# Patient Record
Sex: FEMALE | Race: BLACK OR AFRICAN AMERICAN
[De-identification: names, ages, dates, MRNs, and addresses within clinical notes are randomized per-mention and may not be internally consistent; named-entity substitution may affect disease eponyms.]

---

## 2018-11-27 PROBLEM — Z00.00 ENCOUNTER FOR PREVENTIVE HEALTH EXAMINATION: Noted: 2018-11-27

## 2018-11-28 ENCOUNTER — OTHER (OUTPATIENT)
Age: 33
End: 2018-11-28

## 2018-11-28 ENCOUNTER — APPOINTMENT (OUTPATIENT)
Dept: UROLOGY | Facility: CLINIC | Age: 33
End: 2018-11-28
Payer: COMMERCIAL

## 2018-11-28 VITALS
HEIGHT: 66 IN | HEART RATE: 80 BPM | WEIGHT: 143 LBS | SYSTOLIC BLOOD PRESSURE: 112 MMHG | RESPIRATION RATE: 17 BRPM | TEMPERATURE: 98.3 F | DIASTOLIC BLOOD PRESSURE: 72 MMHG | BODY MASS INDEX: 22.98 KG/M2

## 2018-11-28 DIAGNOSIS — Z87.74 PERSONAL HISTORY OF (CORRECTED) CONGENITAL MALFORMATIONS OF HEART AND CIRCULATORY SYSTEM: ICD-10-CM

## 2018-11-28 DIAGNOSIS — Z78.9 OTHER SPECIFIED HEALTH STATUS: ICD-10-CM

## 2018-11-28 PROCEDURE — 99204 OFFICE O/P NEW MOD 45 MIN: CPT

## 2018-12-13 ENCOUNTER — MESSAGE (OUTPATIENT)
Age: 33
End: 2018-12-13

## 2018-12-18 LAB — COMPN STONE: NORMAL

## 2019-01-22 ENCOUNTER — FORM ENCOUNTER (OUTPATIENT)
Age: 34
End: 2019-01-22

## 2019-01-23 ENCOUNTER — APPOINTMENT (OUTPATIENT)
Dept: CT IMAGING | Facility: IMAGING CENTER | Age: 34
End: 2019-01-23
Payer: COMMERCIAL

## 2019-01-23 ENCOUNTER — OUTPATIENT (OUTPATIENT)
Dept: OUTPATIENT SERVICES | Facility: HOSPITAL | Age: 34
LOS: 1 days | End: 2019-01-23
Payer: COMMERCIAL

## 2019-01-23 ENCOUNTER — APPOINTMENT (OUTPATIENT)
Dept: UROLOGY | Facility: CLINIC | Age: 34
End: 2019-01-23
Payer: COMMERCIAL

## 2019-01-23 DIAGNOSIS — N20.1 CALCULUS OF URETER: ICD-10-CM

## 2019-01-23 PROCEDURE — 99214 OFFICE O/P EST MOD 30 MIN: CPT

## 2019-01-23 PROCEDURE — 74178 CT ABD&PLV WO CNTR FLWD CNTR: CPT | Mod: 26

## 2020-12-29 ENCOUNTER — NON-APPOINTMENT (OUTPATIENT)
Age: 35
End: 2020-12-29

## 2020-12-29 DIAGNOSIS — R35.0 FREQUENCY OF MICTURITION: ICD-10-CM

## 2021-01-05 ENCOUNTER — APPOINTMENT (OUTPATIENT)
Dept: UROLOGY | Facility: CLINIC | Age: 36
End: 2021-01-05
Payer: COMMERCIAL

## 2021-01-05 PROCEDURE — 99214 OFFICE O/P EST MOD 30 MIN: CPT | Mod: 95

## 2021-01-05 RX ORDER — OXYCODONE 5 MG/1
5 TABLET ORAL
Qty: 12 | Refills: 0 | Status: ACTIVE | COMMUNITY
Start: 2021-01-05 | End: 1900-01-01

## 2021-01-05 NOTE — HISTORY OF PRESENT ILLNESS
[Other Location: e.g. School (Enter Location, City,State)___] : at [unfilled], at the time of the visit. [Medical Office: ([facility])___] : at the medical office located in  [Verbal consent obtained from patient] : the patient, [unfilled] [FreeTextEntry1] : The patient-doctor relationship has been established in a face to face fashion via real time video/audio HIPAA compliant communication using telemedicine software. The patient's identity has been confirmed. The patient was previously emailed a copy of the telemedicine consent. They have had a chance to review and has now given verbal consent and has requested care to be assessed and treated through telemedicine and understands there maybe limitations in this process and they may need further follow up care in the office and or hospital settings.\par \par Verbal consent given to me on 01/05/2021 at 8:33 AM by Ms. STEPHENSONDA BHUPENDRAOKKAR[place] [address]. \par \par 34yo female with cc of stone. Pt with hx of stones last seen by me 2y ago during acute stone episode. She had L ureteral stone that she was able to pass on her own. Stone analysis revealed 90/10 CaP/CaOx. Her last CT scan taken in follow-up to assess for stone passage showed only pelvic phleboliths and no additional renal stones. Plan was made for 24h urine for eval but pt did not follow-up. She called office recently with L flank pain radiating to lower back with associated nausea and emesis. Pt was empirically started on flomax and plan made for US eval. US 12/21 showed mild L hydro with possible distal L stone. \par \par She has had bladder pressure for the last week. On MARCY she went to urgent care and was given abx. She reports urine dip was negative but was still empirically started on cefpodxime. She called yesterday and culture was negative. She stopped the abx. She has been taking motrin 600-800mg as needed with occasional benefit. She had severe pain over the weekend with radiation down into vagina and up into L flank. She took 800mg for this. Has not seen stone pass. Continues to have bladder pressure

## 2021-01-05 NOTE — ASSESSMENT
[FreeTextEntry1] : Suspect stone is very distal based on hx and radiology findings. Pt has been able to pass stones on her own in the past. Discussed continued trial of medical expulsive therapy. \par --cont medical expulsive therapy with flomax\par --pain control with oxycodone, tylenol 1000mg 4x per day and IBU 600mg 4x per day prn\par --stone precautions\par --encourage fluid intake\par --Renal/bladder US in ~3 weeks\par --Needs 24h urine after resolution of this stone episode

## 2021-01-28 ENCOUNTER — APPOINTMENT (OUTPATIENT)
Dept: UROLOGY | Facility: CLINIC | Age: 36
End: 2021-01-28
Payer: COMMERCIAL

## 2021-01-28 VITALS — BODY MASS INDEX: 22.98 KG/M2 | WEIGHT: 143 LBS | HEIGHT: 66 IN | TEMPERATURE: 98.2 F

## 2021-01-28 PROCEDURE — 99072 ADDL SUPL MATRL&STAF TM PHE: CPT

## 2021-01-28 PROCEDURE — 99213 OFFICE O/P EST LOW 20 MIN: CPT

## 2021-01-28 NOTE — HISTORY OF PRESENT ILLNESS
[FreeTextEntry1] : 36yo female with cc of stone. Pt with hx of stones seen by me 2y ago during acute stone episode. She had L ureteral stone that she was able to pass on her own. Stone analysis revealed 90/10 CaP/CaOx. Her last CT scan taken in follow-up to assess for stone passage showed only pelvic phleboliths and no additional renal stones. Plan was made for 24h urine for eval but pt did not follow-up. She called office recently with L flank pain radiating to lower back with associated nausea and emesis. Pt was empirically started on flomax and plan made for US eval. US 12/21 showed mild L hydro with possible distal L stone. Pt seen via telehealth appt earlier this month and there was suspicion of distal stone. She was placed on medical expulsive therapy. plan was made for trial of passage and repeat imaging with US. \par \par She returns today for follow-up. Pain stopped January 7th. She didn’t see a stone pass. Urinary sx have resolved. Did not have repeat ultrasound. Pt reports she has made a conscious effort to drink more water. She is no longer drinking coffee.

## 2021-01-28 NOTE — PHYSICAL EXAM
[General Appearance - In No Acute Distress] : no acute distress [Exaggerated Use Of Accessory Muscles For Inspiration] : no accessory muscle use [Oriented To Time, Place, And Person] : oriented to person, place, and time

## 2021-01-28 NOTE — ASSESSMENT
[FreeTextEntry1] : Suspect stone passed. Still needs imaging eval. \par --d/c flomax\par --Renal/bladder US \par --24h urine eval\par --TEM in 4-6 weeks

## 2021-02-05 ENCOUNTER — APPOINTMENT (OUTPATIENT)
Dept: ULTRASOUND IMAGING | Facility: CLINIC | Age: 36
End: 2021-02-05
Payer: COMMERCIAL

## 2021-02-05 ENCOUNTER — OUTPATIENT (OUTPATIENT)
Dept: OUTPATIENT SERVICES | Facility: HOSPITAL | Age: 36
LOS: 1 days | End: 2021-02-05
Payer: COMMERCIAL

## 2021-02-05 DIAGNOSIS — N20.1 CALCULUS OF URETER: ICD-10-CM

## 2021-02-05 DIAGNOSIS — Z00.8 ENCOUNTER FOR OTHER GENERAL EXAMINATION: ICD-10-CM

## 2021-02-05 PROCEDURE — 76775 US EXAM ABDO BACK WALL LIM: CPT | Mod: 26

## 2021-03-08 ENCOUNTER — APPOINTMENT (OUTPATIENT)
Dept: UROLOGY | Facility: CLINIC | Age: 36
End: 2021-03-08
Payer: COMMERCIAL

## 2021-03-08 PROCEDURE — 99072 ADDL SUPL MATRL&STAF TM PHE: CPT

## 2021-03-08 PROCEDURE — 99214 OFFICE O/P EST MOD 30 MIN: CPT

## 2021-03-08 NOTE — PHYSICAL EXAM
[General Appearance - In No Acute Distress] : no acute distress [Exaggerated Use Of Accessory Muscles For Inspiration] : no accessory muscle use [Oriented To Time, Place, And Person] : oriented to person, place, and time [Abdomen Soft] : soft [Abdomen Tenderness] : non-tender [Normal Station and Gait] : the gait and station were normal for the patient's age

## 2021-03-08 NOTE — HISTORY OF PRESENT ILLNESS
[FreeTextEntry1] : 36yo female with cc of stone. Pt with hx of stones seen by me 2y ago during acute stone episode. She had L ureteral stone that she was able to pass on her own. Stone analysis revealed 90/10 CaP/CaOx. Her last CT scan taken in follow-up to assess for stone passage showed only pelvic phleboliths and no additional renal stones. Plan was made for 24h urine for eval but pt did not follow-up. She called office recently with L flank pain radiating to lower back with associated nausea and emesis. Pt was empirically started on flomax and plan made for US eval. US 12/21 showed mild L hydro with possible distal L stone. Pt seen via telehealth appt earlier this month and there was suspicion of distal stone. She was placed on medical expulsive therapy. plan was made for trial of passage and repeat imaging with US. \par \par She was seen 2mo ago for follow-up. Pain stopped January 7th. She didn’t see a stone pass. Urinary sx resolved. Pt reports she has made a conscious effort to drink more water. She is no longer drinking coffee. Plan was made for renal US eval and 24h urine. \par \par Pt returns today for follow-up. US shows no stones and no hydro. 24h urine shows low urine volumes (0.8-1.25L). Urine calcium was elevated on one day with corresponding elevation in salt (227). Pt at risk for CaOx and CaP stones primarily. She reports higher salt diet and snacks in evening with chips.

## 2021-03-08 NOTE — ASSESSMENT
[FreeTextEntry1] : Passed stone. No additional stones seen. Reviewed stone risk factors\par --Increase fluid intake. Needs to double at least. Goal of 2.5L of urine production per day or more\par --Moderate salt intake. Goal 2500-3000mg / day. D/c chips. \par --repeat 24h urine in 2mo. \par --TEM after 24h urine\par --renal US in 6mo

## 2021-03-26 ENCOUNTER — TRANSCRIPTION ENCOUNTER (OUTPATIENT)
Age: 36
End: 2021-03-26

## 2021-12-24 ENCOUNTER — TRANSCRIPTION ENCOUNTER (OUTPATIENT)
Age: 36
End: 2021-12-24

## 2022-01-06 ENCOUNTER — TRANSCRIPTION ENCOUNTER (OUTPATIENT)
Age: 37
End: 2022-01-06

## 2023-05-17 ENCOUNTER — NON-APPOINTMENT (OUTPATIENT)
Age: 38
End: 2023-05-17

## 2023-06-06 ENCOUNTER — APPOINTMENT (OUTPATIENT)
Dept: ULTRASOUND IMAGING | Facility: CLINIC | Age: 38
End: 2023-06-06
Payer: COMMERCIAL

## 2023-06-06 PROCEDURE — 76775 US EXAM ABDO BACK WALL LIM: CPT

## 2023-06-08 ENCOUNTER — NON-APPOINTMENT (OUTPATIENT)
Age: 38
End: 2023-06-08

## 2023-06-13 ENCOUNTER — APPOINTMENT (OUTPATIENT)
Dept: UROLOGY | Facility: CLINIC | Age: 38
End: 2023-06-13
Payer: COMMERCIAL

## 2023-06-13 VITALS
WEIGHT: 145 LBS | DIASTOLIC BLOOD PRESSURE: 90 MMHG | SYSTOLIC BLOOD PRESSURE: 133 MMHG | RESPIRATION RATE: 17 BRPM | BODY MASS INDEX: 24.75 KG/M2 | TEMPERATURE: 98.6 F | HEART RATE: 87 BPM | HEIGHT: 64 IN

## 2023-06-13 PROCEDURE — 99214 OFFICE O/P EST MOD 30 MIN: CPT

## 2023-06-13 RX ORDER — OXYCODONE 5 MG/1
5 TABLET ORAL
Qty: 10 | Refills: 0 | Status: ACTIVE | COMMUNITY
Start: 2023-06-13 | End: 1900-01-01

## 2023-06-13 NOTE — PHYSICAL EXAM
[General Appearance - In No Acute Distress] : no acute distress [Abdomen Soft] : soft [Abdomen Tenderness] : non-tender [Exaggerated Use Of Accessory Muscles For Inspiration] : no accessory muscle use [Oriented To Time, Place, And Person] : oriented to person, place, and time [Normal Station and Gait] : the gait and station were normal for the patient's age

## 2023-06-20 NOTE — HISTORY OF PRESENT ILLNESS
[FreeTextEntry1] : 38yo female with cc of stone. Pt with hx of stones seen by me 2018 and 2021 for stone episodes. In 2018, she had L ureteral stone that she was able to pass on her own. Stone analysis revealed 90/10 CaP/CaOx. Her last CT scan taken in follow-up to assess for stone passage showed only pelvic phleboliths and no additional renal stones. Plan was made for 24h urine for eval but pt did not follow-up. She called office 2021 with L flank pain radiating to lower back with associated nausea and emesis. Pt was empirically started on flomax and plan made for US eval. US 12/21 showed mild L hydro with possible distal L stone. Pt never saw stone pass buyt f/u US showed no stones and no hydro. 24h urine showed low urine volumes (0.8-1.25L). Urine calcium was elevated on one day with corresponding elevation in salt (227). Pt at risk for CaOx and CaP stones primarily. She reports higher salt diet and snacks in evening with chips. She was counseled about dietary changes with plan for repeat urine and imaging but this was not done. \par \par She comes back in today. She has a daughter that is 8mo old and that several months after her birth (similar to 2018) developed bothersome sx and thought she had a stone. Stopped drinking iced cappuccinos. And tried to pass on her own. Pain would be intermittent. Called in may and was counseled about renal US and fluids. Delayed this until she Eventually developed worsening pain with nausea and emesis. Had US done that showed mod L hydro with 1cm proximal stone. No additional stones seen. She started flomax. Has not had pain now for 7d.

## 2023-06-20 NOTE — ASSESSMENT
[FreeTextEntry1] : Unclear if passed stone or not. \par --medical expulsive therapy with flomax\par --pain control with tylenol, IBU, and oxycodone\par --stone precautions\par --encourage fluid intake\par --CT stone hunt in 1mo to eval for passage\par --RTC in 1mo

## 2023-07-27 ENCOUNTER — RESULT REVIEW (OUTPATIENT)
Age: 38
End: 2023-07-27

## 2023-07-27 ENCOUNTER — APPOINTMENT (OUTPATIENT)
Dept: CT IMAGING | Facility: CLINIC | Age: 38
End: 2023-07-27
Payer: COMMERCIAL

## 2023-07-27 PROCEDURE — 74176 CT ABD & PELVIS W/O CONTRAST: CPT

## 2023-07-31 ENCOUNTER — NON-APPOINTMENT (OUTPATIENT)
Age: 38
End: 2023-07-31

## 2023-09-11 ENCOUNTER — RX RENEWAL (OUTPATIENT)
Age: 38
End: 2023-09-11

## 2023-12-08 ENCOUNTER — APPOINTMENT (OUTPATIENT)
Dept: UROLOGY | Facility: CLINIC | Age: 38
End: 2023-12-08
Payer: COMMERCIAL

## 2023-12-08 ENCOUNTER — OUTPATIENT (OUTPATIENT)
Dept: OUTPATIENT SERVICES | Facility: HOSPITAL | Age: 38
LOS: 1 days | End: 2023-12-08
Payer: COMMERCIAL

## 2023-12-08 DIAGNOSIS — N20.0 CALCULUS OF KIDNEY: ICD-10-CM

## 2023-12-08 DIAGNOSIS — R35.0 FREQUENCY OF MICTURITION: ICD-10-CM

## 2023-12-08 PROCEDURE — 76775 US EXAM ABDO BACK WALL LIM: CPT | Mod: 26

## 2023-12-08 PROCEDURE — 99213 OFFICE O/P EST LOW 20 MIN: CPT

## 2023-12-08 PROCEDURE — 76775 US EXAM ABDO BACK WALL LIM: CPT

## 2023-12-11 PROBLEM — N20.0 KIDNEY STONES: Status: ACTIVE | Noted: 2018-11-28

## 2023-12-12 DIAGNOSIS — N20.0 CALCULUS OF KIDNEY: ICD-10-CM

## 2023-12-12 DIAGNOSIS — R10.9 UNSPECIFIED ABDOMINAL PAIN: ICD-10-CM

## 2024-02-05 ENCOUNTER — NON-APPOINTMENT (OUTPATIENT)
Age: 39
End: 2024-02-05

## 2024-02-06 LAB
APPEARANCE: CLEAR
BACTERIA UR CULT: NORMAL
BACTERIA: NEGATIVE /HPF
BILIRUBIN URINE: NEGATIVE
BLOOD URINE: ABNORMAL
CAST: 0 /LPF
COLOR: YELLOW
EPITHELIAL CELLS: 1 /HPF
GLUCOSE QUALITATIVE U: NEGATIVE MG/DL
KETONES URINE: NEGATIVE MG/DL
LEUKOCYTE ESTERASE URINE: ABNORMAL
MICROSCOPIC-UA: NORMAL
NITRITE URINE: NEGATIVE
PH URINE: 6
PROTEIN URINE: NEGATIVE MG/DL
RED BLOOD CELLS URINE: 2 /HPF
REVIEW: NORMAL
SPECIFIC GRAVITY URINE: 1.01
UROBILINOGEN URINE: 0.2 MG/DL
WHITE BLOOD CELLS URINE: 4 /HPF

## 2024-02-06 RX ORDER — TAMSULOSIN HYDROCHLORIDE 0.4 MG/1
0.4 CAPSULE ORAL
Qty: 30 | Refills: 1 | Status: ACTIVE | COMMUNITY
Start: 2018-11-28 | End: 1900-01-01

## 2024-02-07 ENCOUNTER — APPOINTMENT (OUTPATIENT)
Dept: ULTRASOUND IMAGING | Facility: CLINIC | Age: 39
End: 2024-02-07
Payer: COMMERCIAL

## 2024-02-07 ENCOUNTER — NON-APPOINTMENT (OUTPATIENT)
Age: 39
End: 2024-02-07

## 2024-02-07 PROCEDURE — 76770 US EXAM ABDO BACK WALL COMP: CPT

## 2024-02-13 ENCOUNTER — APPOINTMENT (OUTPATIENT)
Dept: UROLOGY | Facility: CLINIC | Age: 39
End: 2024-02-13
Payer: COMMERCIAL

## 2024-02-13 DIAGNOSIS — N20.1 CALCULUS OF URETER: ICD-10-CM

## 2024-02-13 DIAGNOSIS — R10.9 UNSPECIFIED ABDOMINAL PAIN: ICD-10-CM

## 2024-02-13 PROCEDURE — 99443: CPT

## 2024-02-13 NOTE — ASSESSMENT
[FreeTextEntry1] : Long hx of stones. New flank pain and fidnings of L proximal stone.  --medical expulsive therapy with flomax --pain control with tylenol, IBU, and oxycodone --stone precautions --encourage fluid intake --CT stone díaz in 1mo to eval for passage   Reviewed stone risk factors as well. I spoke with the patient about diet modification, which includes: --Increasing fluid intake to produce 2 to 2.5 liters of urine per day (approx 3 liters intake), and should be primarily water.  --Calcium intake should be approximately 1000 mg per day.  --Oxalate intake should be reduced- most common sources in diet which have very high levels include peanuts/nuts, tea, coffee, chocolate, spinach, beets, rhubarb, swiss chard.   --Finally, salt intake should be reduced as high levels in the diet will increase urinary calcium.  <2400 mg per day on a low sodium diet is strongly recommended.

## 2024-02-13 NOTE — HISTORY OF PRESENT ILLNESS
[FreeTextEntry1] : Virtual visit conducted today via telephonic means per pt request.   39yo female with cc of stone and flank pain. Pt with hx of stones seen by me 2018 and 2021 for stone episodes. In 2018, she had L ureteral stone that she was able to pass on her own. Stone analysis revealed 90/10 CaP/CaOx. Her last CT scan taken in follow-up to assess for stone passage showed only pelvic phleboliths and no additional renal stones. Plan was made for 24h urine for eval but pt did not follow-up. She called office 2021 with L flank pain radiating to lower back with associated nausea and emesis. Pt was empirically started on flomax and plan made for US eval. US 12/21 showed mild L hydro with possible distal L stone. Pt never saw stone pass but f/u US showed no stones and no hydro. 24h urine showed low urine volumes (0.8-1.25L). Urine calcium was elevated on one day with corresponding elevation in salt (227). Pt at risk for CaOx and CaP stones primarily. She reports higher salt diet and snacks in evening with chips. She was counseled about dietary changes with plan for repeat urine and imaging but this was not done. She was seen June 2023 and reported that several months after her birth (similar to 2018) developed bothersome sx and thought she had a stone. Stopped drinking iced cappuccinos. And tried to pass on her own. Pain would be intermittent. Called in may and was counseled about renal US and fluids. Delayed until she eventually developed worsening pain with nausea and emesis. Had US done that showed mod L hydro with 1cm proximal stone. No additional stones seen. She started flomax. Underwent CT eval for passage that showed 6mm LLP stone without hydro. Plan again made for 24h urine. Last seen in Dec and again did not do 24h urine. Had c/o L flank pain. No radiation. No nausea and emesis. Felt similar to prior stone pain. Underwent US in office that showed unchanged LLP stone--measures slightly larger at 8.6mm. No hydro. No new stones. Incidental note of gallstones as well.   Now reports >1 week hx of intermittent but severe L flank pain. Called office for sx. Started flomax she had at home. Underwent US last week that showed L hydro with proximal stone. She has been pain free over the last couple of days.   In the interim, pt underwent repeat 24h urine studies. This continues to show low urine volumes (although slightly improved at 1.66L) as well as borderline calcium and salt. Oxalate increased on most recent study./

## 2024-03-07 ENCOUNTER — APPOINTMENT (OUTPATIENT)
Dept: CT IMAGING | Facility: CLINIC | Age: 39
End: 2024-03-07
Payer: COMMERCIAL

## 2024-03-07 PROCEDURE — 74176 CT ABD & PELVIS W/O CONTRAST: CPT
